# Patient Record
Sex: FEMALE | Race: BLACK OR AFRICAN AMERICAN
[De-identification: names, ages, dates, MRNs, and addresses within clinical notes are randomized per-mention and may not be internally consistent; named-entity substitution may affect disease eponyms.]

---

## 2018-04-15 ENCOUNTER — HOSPITAL ENCOUNTER (EMERGENCY)
Dept: HOSPITAL 35 - ER | Age: 33
Discharge: HOME | End: 2018-04-15
Payer: COMMERCIAL

## 2018-04-15 VITALS — BODY MASS INDEX: 24.43 KG/M2 | HEIGHT: 66 IN | WEIGHT: 152.01 LBS

## 2018-04-15 VITALS — DIASTOLIC BLOOD PRESSURE: 63 MMHG | SYSTOLIC BLOOD PRESSURE: 116 MMHG

## 2018-04-15 DIAGNOSIS — J02.9: Primary | ICD-10-CM

## 2018-04-15 DIAGNOSIS — Z87.891: ICD-10-CM

## 2019-11-18 ENCOUNTER — HOSPITAL ENCOUNTER (OUTPATIENT)
Dept: HOSPITAL 35 - ULTRA | Age: 34
End: 2019-11-18
Attending: FAMILY MEDICINE
Payer: COMMERCIAL

## 2019-11-18 DIAGNOSIS — N83.202: ICD-10-CM

## 2019-11-18 DIAGNOSIS — N83.201: Primary | ICD-10-CM

## 2019-11-18 DIAGNOSIS — N92.0: ICD-10-CM

## 2020-03-17 ENCOUNTER — HOSPITAL ENCOUNTER (EMERGENCY)
Dept: HOSPITAL 35 - ER | Age: 35
Discharge: HOME | End: 2020-03-17
Payer: COMMERCIAL

## 2020-03-17 VITALS — HEIGHT: 66 IN | WEIGHT: 173 LBS | BODY MASS INDEX: 27.8 KG/M2

## 2020-03-17 VITALS — SYSTOLIC BLOOD PRESSURE: 136 MMHG | DIASTOLIC BLOOD PRESSURE: 78 MMHG

## 2020-03-17 DIAGNOSIS — R19.7: ICD-10-CM

## 2020-03-17 DIAGNOSIS — R11.10: ICD-10-CM

## 2020-03-17 DIAGNOSIS — N20.0: Primary | ICD-10-CM

## 2020-03-17 DIAGNOSIS — F17.210: ICD-10-CM

## 2020-03-17 LAB
ALBUMIN SERPL-MCNC: 4.1 G/DL (ref 3.4–5)
ALT SERPL-CCNC: 17 U/L (ref 30–65)
ANION GAP SERPL CALC-SCNC: 12 MMOL/L (ref 7–16)
AST SERPL-CCNC: 16 U/L (ref 15–37)
BACTERIA-REFLEX: (no result) /HPF
BASOPHILS NFR BLD AUTO: 0.2 % (ref 0–2)
BILIRUB SERPL-MCNC: 0.5 MG/DL
BILIRUB UR-MCNC: NEGATIVE MG/DL
BUN SERPL-MCNC: 12 MG/DL (ref 7–18)
CALCIUM SERPL-MCNC: 8.8 MG/DL (ref 8.5–10.1)
CHLORIDE SERPL-SCNC: 101 MMOL/L (ref 98–107)
CO2 SERPL-SCNC: 24 MMOL/L (ref 21–32)
COLOR UR: YELLOW
CREAT SERPL-MCNC: 1 MG/DL (ref 0.6–1)
EOSINOPHIL NFR BLD: 0.3 % (ref 0–3)
ERYTHROCYTE [DISTWIDTH] IN BLOOD BY AUTOMATED COUNT: 13.6 % (ref 10.5–14.5)
GLUCOSE SERPL-MCNC: 101 MG/DL (ref 74–106)
GRANULOCYTES NFR BLD MANUAL: 86.6 % (ref 36–66)
HCT VFR BLD CALC: 39.3 % (ref 37–47)
HGB BLD-MCNC: 13 GM/DL (ref 12–15)
KETONES UR STRIP-MCNC: NEGATIVE MG/DL
LIPASE: 141 U/L (ref 73–393)
LYMPHOCYTES NFR BLD AUTO: 7.8 % (ref 24–44)
MCH RBC QN AUTO: 29.2 PG (ref 26–34)
MCHC RBC AUTO-ENTMCNC: 33.1 G/DL (ref 28–37)
MCV RBC: 88.2 FL (ref 80–100)
MONOCYTES NFR BLD: 5.1 % (ref 1–8)
NEUTROPHILS # BLD: 7.8 THOU/UL (ref 1.4–8.2)
PLATELET # BLD: 306 THOU/UL (ref 150–400)
POTASSIUM SERPL-SCNC: 3.3 MMOL/L (ref 3.5–5.1)
PROT SERPL-MCNC: 8.5 G/DL (ref 6.4–8.2)
RBC # BLD AUTO: 4.46 MIL/UL (ref 4.2–5)
RBC # UR STRIP: (no result) /UL
RBC #/AREA URNS HPF: (no result) /HPF (ref 0–2)
SODIUM SERPL-SCNC: 137 MMOL/L (ref 136–145)
SP GR UR STRIP: 1.02 (ref 1–1.03)
SQUAMOUS: (no result) /LPF (ref 0–3)
URINE CLARITY: (no result)
URINE GLUCOSE-RANDOM*: NEGATIVE
URINE LEUKOCYTES-REFLEX: (no result)
URINE NITRITE-REFLEX: NEGATIVE
URINE PROTEIN (DIPSTICK): (no result)
URINE WBC-REFLEX: (no result) /HPF (ref 0–5)
UROBILINOGEN UR STRIP-ACNC: 1 E.U./DL (ref 0.2–1)
WBC # BLD AUTO: 9 THOU/UL (ref 4–11)

## 2020-07-23 ENCOUNTER — HOSPITAL ENCOUNTER (OUTPATIENT)
Dept: HOSPITAL 35 - LAB | Age: 35
End: 2020-07-23
Attending: FAMILY MEDICINE
Payer: COMMERCIAL

## 2020-07-23 DIAGNOSIS — R06.02: Primary | ICD-10-CM

## 2020-07-23 DIAGNOSIS — Z20.828: ICD-10-CM

## 2020-12-22 ENCOUNTER — HOSPITAL ENCOUNTER (EMERGENCY)
Dept: HOSPITAL 35 - ER | Age: 35
Discharge: HOME | End: 2020-12-22
Payer: COMMERCIAL

## 2020-12-22 VITALS — HEIGHT: 66 IN | WEIGHT: 172 LBS | BODY MASS INDEX: 27.64 KG/M2

## 2020-12-22 VITALS — DIASTOLIC BLOOD PRESSURE: 67 MMHG | SYSTOLIC BLOOD PRESSURE: 118 MMHG

## 2020-12-22 DIAGNOSIS — R07.89: ICD-10-CM

## 2020-12-22 DIAGNOSIS — R09.81: Primary | ICD-10-CM

## 2020-12-22 DIAGNOSIS — R53.83: ICD-10-CM

## 2020-12-22 DIAGNOSIS — Z20.828: ICD-10-CM

## 2020-12-22 DIAGNOSIS — Z87.891: ICD-10-CM

## 2020-12-22 DIAGNOSIS — R05: ICD-10-CM

## 2020-12-22 LAB
ANION GAP SERPL CALC-SCNC: 7 MMOL/L (ref 7–16)
BASOPHILS NFR BLD AUTO: 0.8 % (ref 0–2)
BUN SERPL-MCNC: 8 MG/DL (ref 7–18)
CALCIUM SERPL-MCNC: 9.7 MG/DL (ref 8.5–10.1)
CHLORIDE SERPL-SCNC: 105 MMOL/L (ref 98–107)
CO2 SERPL-SCNC: 28 MMOL/L (ref 21–32)
CREAT SERPL-MCNC: 0.9 MG/DL (ref 0.6–1)
EOSINOPHIL NFR BLD: 2.2 % (ref 0–3)
ERYTHROCYTE [DISTWIDTH] IN BLOOD BY AUTOMATED COUNT: 13.4 % (ref 10.5–14.5)
GLUCOSE SERPL-MCNC: 97 MG/DL (ref 74–106)
GRANULOCYTES NFR BLD MANUAL: 61.9 % (ref 36–66)
HCT VFR BLD CALC: 35.3 % (ref 37–47)
HGB BLD-MCNC: 11.8 GM/DL (ref 12–15)
LYMPHOCYTES NFR BLD AUTO: 27.3 % (ref 24–44)
MCH RBC QN AUTO: 29.5 PG (ref 26–34)
MCHC RBC AUTO-ENTMCNC: 33.5 G/DL (ref 28–37)
MCV RBC: 88 FL (ref 80–100)
MONOCYTES NFR BLD: 7.8 % (ref 1–8)
NEUTROPHILS # BLD: 4.3 THOU/UL (ref 1.4–8.2)
PLATELET # BLD: 297 THOU/UL (ref 150–400)
POTASSIUM SERPL-SCNC: 3.8 MMOL/L (ref 3.5–5.1)
RBC # BLD AUTO: 4.01 MIL/UL (ref 4.2–5)
SODIUM SERPL-SCNC: 140 MMOL/L (ref 136–145)
WBC # BLD AUTO: 6.9 THOU/UL (ref 4–11)

## 2020-12-22 NOTE — EKG
[place] [address] [facility] [facility] [place]  [address]
Phone:  [phone]                    ELECTROCARDIOGRAM REPORT      
_______________________________________________________________________________
 
Name:       PRATIMA GALLEGO           [facility] #:                     [id number]     Account #:      [id number]  
Admission:  20    Attend Phys:                          
Discharge:  20    Date of Birth:  [birth year]  
                                                          Report #: [id number]
   [id number]
_______________________________________________________________________________
                         [facility] ED
                                       
Test Date:    2020               Test Time:    11:11:22
Pat Name:     PRATIMA GALLEGO            Department:   
Patient ID:   [id number]            Room:          
Gender:       F                        Technician:   KELLY
:          1985               Requested By: Wan Rodriguez
Order Number: [id number] MD:   Hector Wise
                                 Measurements
Intervals                              Axis          
Rate:         91                       P:            49
RI:           142                      QRS:          53
QRSD:         80                       T:            42
QT:           342                                    
QTc:          421                                    
                           Interpretive Statements
Sinus rhythm
Probable left atrial enlargement
Compared to ECG 2014 17:50:30
No significant changes
Electronically Signed On 2020 13:00:09 CST by Hector Wise
[email or web address]
 
 
 
 
 
 
 
 
 
 
 
 
 
 
 
 
 
 
 
 
 
  <ELECTRONICALLY SIGNED>
   By: Hector Wise MD, [facility]    
  20     1300
D: 20 1111                           _____________________________________
T: 20 1111                           Hector Wise MD, FACTED      /EPI

## 2020-12-25 ENCOUNTER — HOSPITAL ENCOUNTER (EMERGENCY)
Dept: HOSPITAL 35 - ER | Age: 35
Discharge: HOME | End: 2020-12-25
Payer: COMMERCIAL

## 2020-12-25 VITALS — BODY MASS INDEX: 27.64 KG/M2 | HEIGHT: 66 IN | WEIGHT: 172 LBS

## 2020-12-25 VITALS — SYSTOLIC BLOOD PRESSURE: 125 MMHG | DIASTOLIC BLOOD PRESSURE: 74 MMHG

## 2020-12-25 DIAGNOSIS — Z87.891: ICD-10-CM

## 2020-12-25 DIAGNOSIS — J02.0: Primary | ICD-10-CM

## 2020-12-25 DIAGNOSIS — Z79.899: ICD-10-CM

## 2021-01-30 ENCOUNTER — HOSPITAL ENCOUNTER (EMERGENCY)
Dept: HOSPITAL 35 - ER | Age: 36
Discharge: HOME | End: 2021-01-30
Payer: COMMERCIAL

## 2021-01-30 VITALS — DIASTOLIC BLOOD PRESSURE: 73 MMHG | SYSTOLIC BLOOD PRESSURE: 99 MMHG

## 2021-01-30 VITALS — BODY MASS INDEX: 27.47 KG/M2 | HEIGHT: 67 IN | WEIGHT: 175 LBS

## 2021-01-30 VITALS — HEIGHT: 66 IN | BODY MASS INDEX: 27.48 KG/M2 | WEIGHT: 171.01 LBS

## 2021-01-30 VITALS — DIASTOLIC BLOOD PRESSURE: 68 MMHG | SYSTOLIC BLOOD PRESSURE: 117 MMHG

## 2021-01-30 DIAGNOSIS — Z79.899: ICD-10-CM

## 2021-01-30 DIAGNOSIS — N20.0: Primary | ICD-10-CM

## 2021-01-30 DIAGNOSIS — Z87.891: ICD-10-CM

## 2021-01-30 DIAGNOSIS — N39.0: ICD-10-CM

## 2021-01-30 LAB
ALBUMIN SERPL-MCNC: 3.3 G/DL (ref 3.4–5)
ALBUMIN SERPL-MCNC: 3.5 G/DL (ref 3.4–5)
ALT SERPL-CCNC: 32 U/L (ref 30–65)
ALT SERPL-CCNC: 36 U/L (ref 30–65)
AMYLASE SERPL-CCNC: 89 U/L (ref 25–115)
ANION GAP SERPL CALC-SCNC: 10 MMOL/L (ref 7–16)
ANION GAP SERPL CALC-SCNC: 8 MMOL/L (ref 7–16)
AST SERPL-CCNC: 17 U/L (ref 15–37)
AST SERPL-CCNC: 19 U/L (ref 15–37)
BACTERIA-REFLEX: (no result) /HPF
BASOPHILS NFR BLD AUTO: 0.3 % (ref 0–2)
BASOPHILS NFR BLD AUTO: 0.6 % (ref 0–2)
BILIRUB DIRECT SERPL-MCNC: < 0.1 MG/DL
BILIRUB SERPL-MCNC: 0.2 MG/DL (ref 0.2–1)
BILIRUB SERPL-MCNC: 0.3 MG/DL (ref 0.2–1)
BILIRUB UR-MCNC: NEGATIVE MG/DL
BILIRUB UR-MCNC: NEGATIVE MG/DL
BUN SERPL-MCNC: 10 MG/DL (ref 7–18)
BUN SERPL-MCNC: 9 MG/DL (ref 7–18)
CALCIUM OXALATE: (no result) /LPF
CALCIUM OXALATE: (no result) /LPF
CALCIUM SERPL-MCNC: 8.6 MG/DL (ref 8.5–10.1)
CALCIUM SERPL-MCNC: 9.2 MG/DL (ref 8.5–10.1)
CHLORIDE SERPL-SCNC: 105 MMOL/L (ref 98–107)
CHLORIDE SERPL-SCNC: 106 MMOL/L (ref 98–107)
CO2 SERPL-SCNC: 25 MMOL/L (ref 21–32)
CO2 SERPL-SCNC: 27 MMOL/L (ref 21–32)
COLOR UR: (no result)
COLOR UR: YELLOW
CREAT SERPL-MCNC: 1 MG/DL (ref 0.6–1)
CREAT SERPL-MCNC: 1.4 MG/DL (ref 0.6–1)
EOSINOPHIL NFR BLD: 1.2 % (ref 0–3)
EOSINOPHIL NFR BLD: 2.3 % (ref 0–3)
ERYTHROCYTE [DISTWIDTH] IN BLOOD BY AUTOMATED COUNT: 13.4 % (ref 10.5–14.5)
ERYTHROCYTE [DISTWIDTH] IN BLOOD BY AUTOMATED COUNT: 13.5 % (ref 10.5–14.5)
GLUCOSE SERPL-MCNC: 111 MG/DL (ref 74–106)
GLUCOSE SERPL-MCNC: 120 MG/DL (ref 74–106)
GRANULOCYTES NFR BLD MANUAL: 53 % (ref 36–66)
GRANULOCYTES NFR BLD MANUAL: 69 % (ref 36–66)
HCT VFR BLD CALC: 33.8 % (ref 37–47)
HCT VFR BLD CALC: 34.4 % (ref 37–47)
HGB BLD-MCNC: 11.1 GM/DL (ref 12–15)
HGB BLD-MCNC: 11.6 GM/DL (ref 12–15)
KETONES UR STRIP-MCNC: NEGATIVE MG/DL
KETONES UR STRIP-MCNC: NEGATIVE MG/DL
LIPASE: 294 U/L (ref 73–393)
LYMPHOCYTES NFR BLD AUTO: 23.4 % (ref 24–44)
LYMPHOCYTES NFR BLD AUTO: 36.4 % (ref 24–44)
MCH RBC QN AUTO: 29 PG (ref 26–34)
MCH RBC QN AUTO: 29.7 PG (ref 26–34)
MCHC RBC AUTO-ENTMCNC: 32.8 G/DL (ref 28–37)
MCHC RBC AUTO-ENTMCNC: 33.8 G/DL (ref 28–37)
MCV RBC: 87.8 FL (ref 80–100)
MCV RBC: 88.3 FL (ref 80–100)
MONOCYTES NFR BLD: 6.1 % (ref 1–8)
MONOCYTES NFR BLD: 7.7 % (ref 1–8)
MUCUS: (no result) STRN/LPF
NEUTROPHILS # BLD: 4.1 THOU/UL (ref 1.4–8.2)
NEUTROPHILS # BLD: 8.6 THOU/UL (ref 1.4–8.2)
PLATELET # BLD: 325 THOU/UL (ref 150–400)
PLATELET # BLD: 335 THOU/UL (ref 150–400)
POTASSIUM SERPL-SCNC: 3.5 MMOL/L (ref 3.5–5.1)
POTASSIUM SERPL-SCNC: 3.8 MMOL/L (ref 3.5–5.1)
PROT SERPL-MCNC: 7.1 G/DL (ref 6.4–8.2)
PROT SERPL-MCNC: 7.5 G/DL (ref 6.4–8.2)
RBC # BLD AUTO: 3.83 MIL/UL (ref 4.2–5)
RBC # BLD AUTO: 3.92 MIL/UL (ref 4.2–5)
RBC # UR STRIP: (no result) /UL
RBC # UR STRIP: (no result) /UL
RBC #/AREA URNS HPF: (no result) /HPF (ref 0–2)
RBC #/AREA URNS HPF: (no result) /HPF (ref 0–2)
SODIUM SERPL-SCNC: 140 MMOL/L (ref 136–145)
SODIUM SERPL-SCNC: 141 MMOL/L (ref 136–145)
SP GR UR STRIP: 1.02 (ref 1–1.03)
SP GR UR STRIP: >= 1.03 (ref 1–1.03)
SQUAMOUS: (no result) /LPF (ref 0–3)
SQUAMOUS: (no result) /LPF (ref 0–3)
TRANSITIONAL EPITHEL CELL: (no result) /LPF
URINE CLARITY: (no result)
URINE CLARITY: (no result)
URINE GLUCOSE-RANDOM*: NEGATIVE
URINE GLUCOSE-RANDOM*: NEGATIVE
URINE LEUKOCYTES-REFLEX: NEGATIVE
URINE LEUKOCYTES-REFLEX: NEGATIVE
URINE NITRITE-REFLEX: NEGATIVE
URINE NITRITE-REFLEX: NEGATIVE
URINE PROTEIN (DIPSTICK): (no result)
URINE PROTEIN (DIPSTICK): (no result)
URINE WBC-REFLEX: (no result) /HPF (ref 0–5)
URINE WBC-REFLEX: (no result) /HPF (ref 0–5)
UROBILINOGEN UR STRIP-ACNC: 0.2 E.U./DL (ref 0.2–1)
UROBILINOGEN UR STRIP-ACNC: 0.2 E.U./DL (ref 0.2–1)
WBC # BLD AUTO: 12.5 THOU/UL (ref 4–11)
WBC # BLD AUTO: 7.7 THOU/UL (ref 4–11)

## 2021-02-10 ENCOUNTER — HOSPITAL ENCOUNTER (EMERGENCY)
Dept: HOSPITAL 35 - ER | Age: 36
Discharge: HOME | End: 2021-02-10
Payer: COMMERCIAL

## 2021-02-10 VITALS — SYSTOLIC BLOOD PRESSURE: 118 MMHG | DIASTOLIC BLOOD PRESSURE: 74 MMHG

## 2021-02-10 VITALS — WEIGHT: 171.01 LBS | HEIGHT: 66 IN | BODY MASS INDEX: 27.48 KG/M2

## 2021-02-10 DIAGNOSIS — N20.0: Primary | ICD-10-CM

## 2021-02-10 DIAGNOSIS — Z79.899: ICD-10-CM

## 2021-02-10 DIAGNOSIS — Z87.891: ICD-10-CM

## 2021-02-10 LAB
ALBUMIN SERPL-MCNC: 3.6 G/DL (ref 3.4–5)
ALT SERPL-CCNC: 35 U/L (ref 14–59)
ANION GAP SERPL CALC-SCNC: 12 MMOL/L (ref 7–16)
AST SERPL-CCNC: 15 U/L (ref 15–37)
BACTERIA-REFLEX: (no result) /HPF
BASOPHILS NFR BLD AUTO: 0.6 % (ref 0–2)
BILIRUB SERPL-MCNC: 0.3 MG/DL (ref 0.2–1)
BILIRUB UR-MCNC: NEGATIVE MG/DL
BUN SERPL-MCNC: 10 MG/DL (ref 7–18)
CALCIUM OXALATE: (no result) /LPF
CALCIUM SERPL-MCNC: 8.9 MG/DL (ref 8.5–10.1)
CHLORIDE SERPL-SCNC: 103 MMOL/L (ref 98–107)
CO2 SERPL-SCNC: 24 MMOL/L (ref 21–32)
COLOR UR: (no result)
CREAT SERPL-MCNC: 1.2 MG/DL (ref 0.6–1)
EOSINOPHIL NFR BLD: 1.4 % (ref 0–3)
ERYTHROCYTE [DISTWIDTH] IN BLOOD BY AUTOMATED COUNT: 13.5 % (ref 10.5–14.5)
GLUCOSE SERPL-MCNC: 104 MG/DL (ref 74–106)
GRANULOCYTES NFR BLD MANUAL: 61.9 % (ref 36–66)
HCT VFR BLD CALC: 35.2 % (ref 37–47)
HGB BLD-MCNC: 11.8 GM/DL (ref 12–15)
KETONES UR STRIP-MCNC: (no result) MG/DL
LYMPHOCYTES NFR BLD AUTO: 27.9 % (ref 24–44)
MCH RBC QN AUTO: 29.3 PG (ref 26–34)
MCHC RBC AUTO-ENTMCNC: 33.4 G/DL (ref 28–37)
MCV RBC: 87.7 FL (ref 80–100)
MONOCYTES NFR BLD: 8.2 % (ref 1–8)
MUCUS: (no result) STRN/LPF
NEUTROPHILS # BLD: 5.8 THOU/UL (ref 1.4–8.2)
PLATELET # BLD: 307 THOU/UL (ref 150–400)
POTASSIUM SERPL-SCNC: 3.7 MMOL/L (ref 3.5–5.1)
PROT SERPL-MCNC: 7.7 G/DL (ref 6.4–8.2)
RBC # BLD AUTO: 4.01 MIL/UL (ref 4.2–5)
RBC # UR STRIP: (no result) /UL
RBC #/AREA URNS HPF: (no result) /HPF (ref 0–2)
SODIUM SERPL-SCNC: 139 MMOL/L (ref 136–145)
SP GR UR STRIP: 1.02 (ref 1–1.03)
SQUAMOUS: (no result) /LPF (ref 0–3)
URINE CLARITY: (no result)
URINE GLUCOSE-RANDOM*: NEGATIVE
URINE LEUKOCYTES-REFLEX: NEGATIVE
URINE NITRITE-REFLEX: NEGATIVE
URINE PROTEIN (DIPSTICK): (no result)
URINE WBC-REFLEX: (no result) /HPF (ref 0–5)
UROBILINOGEN UR STRIP-ACNC: 0.2 E.U./DL (ref 0.2–1)
WBC # BLD AUTO: 9.4 THOU/UL (ref 4–11)

## 2021-07-07 ENCOUNTER — HOSPITAL ENCOUNTER (EMERGENCY)
Dept: HOSPITAL 35 - ER | Age: 36
Discharge: HOME | End: 2021-07-07
Payer: COMMERCIAL

## 2021-07-07 VITALS — BODY MASS INDEX: 28.28 KG/M2 | HEIGHT: 66 IN | WEIGHT: 176 LBS

## 2021-07-07 VITALS — DIASTOLIC BLOOD PRESSURE: 69 MMHG | SYSTOLIC BLOOD PRESSURE: 111 MMHG

## 2021-07-07 DIAGNOSIS — R07.89: Primary | ICD-10-CM

## 2021-07-07 DIAGNOSIS — Z98.51: ICD-10-CM

## 2021-07-07 DIAGNOSIS — Z98.890: ICD-10-CM

## 2021-07-07 DIAGNOSIS — F17.210: ICD-10-CM

## 2021-07-07 DIAGNOSIS — Z87.442: ICD-10-CM

## 2021-07-07 LAB
ALBUMIN SERPL-MCNC: 3.7 G/DL (ref 3.4–5)
ALT SERPL-CCNC: 21 U/L (ref 30–65)
ANION GAP SERPL CALC-SCNC: 9 MMOL/L (ref 7–16)
AST SERPL-CCNC: 17 U/L (ref 15–37)
BASOPHILS NFR BLD AUTO: 0.5 % (ref 0–2)
BILIRUB SERPL-MCNC: 0.4 MG/DL (ref 0.2–1)
BUN SERPL-MCNC: 6 MG/DL (ref 7–18)
CALCIUM SERPL-MCNC: 8.7 MG/DL (ref 8.5–10.1)
CHLORIDE SERPL-SCNC: 105 MMOL/L (ref 98–107)
CO2 SERPL-SCNC: 26 MMOL/L (ref 21–32)
CREAT SERPL-MCNC: 0.9 MG/DL (ref 0.6–1)
EOSINOPHIL NFR BLD: 1.4 % (ref 0–3)
ERYTHROCYTE [DISTWIDTH] IN BLOOD BY AUTOMATED COUNT: 13.8 % (ref 10.5–14.5)
GLUCOSE SERPL-MCNC: 83 MG/DL (ref 74–106)
GRANULOCYTES NFR BLD MANUAL: 53.9 % (ref 36–66)
HCT VFR BLD CALC: 34.2 % (ref 37–47)
HGB BLD-MCNC: 11.4 GM/DL (ref 12–15)
LYMPHOCYTES NFR BLD AUTO: 38.4 % (ref 24–44)
MCH RBC QN AUTO: 29.7 PG (ref 26–34)
MCHC RBC AUTO-ENTMCNC: 33.5 G/DL (ref 28–37)
MCV RBC: 88.5 FL (ref 80–100)
MONOCYTES NFR BLD: 5.8 % (ref 1–8)
NEUTROPHILS # BLD: 6 THOU/UL (ref 1.4–8.2)
PLATELET # BLD: 320 THOU/UL (ref 150–400)
POTASSIUM SERPL-SCNC: 3.3 MMOL/L (ref 3.5–5.1)
PROT SERPL-MCNC: 7.7 G/DL (ref 6.4–8.2)
RBC # BLD AUTO: 3.86 MIL/UL (ref 4.2–5)
SODIUM SERPL-SCNC: 140 MMOL/L (ref 136–145)
TROPONIN I SERPL-MCNC: <0.06 NG/ML (ref ?–0.06)
WBC # BLD AUTO: 11.2 THOU/UL (ref 4–11)

## 2021-07-08 NOTE — EKG
[place] [address] [facility] [place]  [address]
Phone:  [phone]                    ELECTROCARDIOGRAM REPORT      
_______________________________________________________________________________
 
Name:       PRATIMA GALLEGO           [facility]                     [id number]     Account #:      [id number]  
Admission:  21    Attend Phys:                          
Discharge:  21    Date of Birth:  [birth year]  
                                                          Report #: [id number]
   [id number]
_______________________________________________________________________________
                         [facility] ED
                                       
Test Date:    2021               Test Time:    17:21:52
Pat Name:     PRATIMA GALLEGO            Department:   
Patient ID:   [id number]            Room:          
Gender:       F                        Technician:   VANNA BRYANT
:          1985               Requested By: Alvarado Bustamante
Order Number: [id number] MD:   Hector Wise
                                 Measurements
Intervals                              Axis          
Rate:         69                       P:            38
NC:           144                      QRS:          36
QRSD:         88                       T:            31
QT:           390                                    
QTc:          418                                    
                           Interpretive Statements
Sinus rhythm
Compared to ECG 2020 11:11:22
No significant changes
Electronically Signed On 2021 7:25:34 CDT by Hector Wise
[email or web address]
 
 
 
 
 
 
 
 
 
 
 
 
 
 
 
 
 
 
 
 
 
 
  <ELECTRONICALLY SIGNED>
   By: Hector Wise MD, [facility]    
  21     0725
D: 21 172                           _____________________________________
T: 21 1721                           Hector Wise MD, FACC      /EPI

## 2022-02-22 ENCOUNTER — HOSPITAL ENCOUNTER (INPATIENT)
Dept: HOSPITAL 35 - ER | Age: 37
LOS: 4 days | Discharge: HOME | DRG: 494 | End: 2022-02-26
Attending: SURGERY | Admitting: SURGERY
Payer: COMMERCIAL

## 2022-02-22 VITALS — SYSTOLIC BLOOD PRESSURE: 128 MMHG | DIASTOLIC BLOOD PRESSURE: 68 MMHG

## 2022-02-22 VITALS — SYSTOLIC BLOOD PRESSURE: 135 MMHG | DIASTOLIC BLOOD PRESSURE: 63 MMHG

## 2022-02-22 VITALS — HEIGHT: 65.98 IN | WEIGHT: 173 LBS | BODY MASS INDEX: 27.8 KG/M2

## 2022-02-22 VITALS — DIASTOLIC BLOOD PRESSURE: 66 MMHG | SYSTOLIC BLOOD PRESSURE: 107 MMHG
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
## 2022-02-22 DIAGNOSIS — Z20.822: ICD-10-CM

## 2022-02-22 DIAGNOSIS — Z23: ICD-10-CM
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
## 2022-02-22 DIAGNOSIS — Z87.442: ICD-10-CM

## 2022-02-22 DIAGNOSIS — Z79.899: ICD-10-CM

## 2022-02-22 DIAGNOSIS — S82.432A: ICD-10-CM
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
## 2022-02-22 DIAGNOSIS — S82.232A: Primary | ICD-10-CM
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
## 2022-02-22 PROCEDURE — 62900: CPT

## 2022-02-22 PROCEDURE — 56525: CPT

## 2022-02-22 PROCEDURE — 57861: CPT

## 2022-02-22 PROCEDURE — 50101: CPT

## 2022-02-22 PROCEDURE — 56524: CPT

## 2022-02-22 PROCEDURE — 53078: CPT

## 2022-02-22 PROCEDURE — 50386 REMOVE STENT VIA TRANSURETH: CPT

## 2022-02-22 PROCEDURE — 51412: CPT

## 2022-02-22 PROCEDURE — 10102: CPT

## 2022-02-22 PROCEDURE — 62110: CPT

## 2022-02-22 PROCEDURE — 57180 TREAT VAGINAL BLEEDING: CPT

## 2022-02-22 PROCEDURE — 59211: CPT

## 2022-02-22 PROCEDURE — 50010 RENAL EXPLORATION: CPT

## 2022-02-22 PROCEDURE — 56526: CPT

## 2022-02-22 PROCEDURE — 57091: CPT

## 2022-02-22 PROCEDURE — 50635: CPT

## 2022-02-22 PROCEDURE — 70005: CPT

## 2022-02-22 PROCEDURE — 57103: CPT

## 2022-02-22 PROCEDURE — 57860: CPT

## 2022-02-23 VITALS — SYSTOLIC BLOOD PRESSURE: 116 MMHG | DIASTOLIC BLOOD PRESSURE: 61 MMHG

## 2022-02-23 VITALS — DIASTOLIC BLOOD PRESSURE: 55 MMHG | SYSTOLIC BLOOD PRESSURE: 112 MMHG

## 2022-02-23 VITALS — DIASTOLIC BLOOD PRESSURE: 70 MMHG | SYSTOLIC BLOOD PRESSURE: 137 MMHG

## 2022-02-23 LAB
ALBUMIN SERPL-MCNC: 3.3 G/DL (ref 3.4–5)
ANION GAP SERPL CALC-SCNC: 11 MMOL/L (ref 7–16)
BUN SERPL-MCNC: 7 MG/DL (ref 7–18)
CALCIUM SERPL-MCNC: 8.7 MG/DL (ref 8.5–10.1)
CHLORIDE SERPL-SCNC: 101 MMOL/L (ref 98–107)
CO2 SERPL-SCNC: 26 MMOL/L (ref 21–32)
CREAT SERPL-MCNC: 1 MG/DL (ref 0.6–1)
ERYTHROCYTE [DISTWIDTH] IN BLOOD BY AUTOMATED COUNT: 13.9 % (ref 10.5–14.5)
GLUCOSE SERPL-MCNC: 110 MG/DL (ref 74–106)
HCT VFR BLD CALC: 31.8 % (ref 37–47)
HGB BLD-MCNC: 10.5 GM/DL (ref 12–15)
MAGNESIUM SERPL-MCNC: 1.9 MG/DL (ref 1.8–2.4)
MCH RBC QN AUTO: 29.2 PG (ref 26–34)
MCHC RBC AUTO-ENTMCNC: 32.8 G/DL (ref 28–37)
MCV RBC: 88.8 FL (ref 80–100)
PHOSPHATE SERPL-MCNC: 3 MG/DL (ref 2.5–4.9)
PLATELET # BLD: 302 THOU/UL (ref 150–400)
POTASSIUM SERPL-SCNC: 3.3 MMOL/L (ref 3.5–5.1)
RBC # BLD AUTO: 3.58 MIL/UL (ref 4.2–5)
SODIUM SERPL-SCNC: 138 MMOL/L (ref 136–145)
WBC # BLD AUTO: 16.2 THOU/UL (ref 4–11)

## 2022-02-23 NOTE — O
[facility]
[address]
[place]   [address]                     OPERATIVE REPORT              
_______________________________________________________________________________
 
Name:       PRATIMA GALLEGO           Room #:         441-P       ADM IN  
M.R.#:      [id number]                       Account #:      [id number]  
Admission:  02/22/22    Attend Phys:    Ciro Britt MD    
Discharge:              Date of Birth:  [birth year]  
                                                          Report #: [id number]
                                                                    [id number] 
_______________________________________________________________________________
THIS REPORT FOR:  
 
cc:  Crystal Dawn MD, Nora P. MD Kneidel, Matthew T. MD                                        ~
 
DATE OF SERVICE: 02/22/2022
 
PREOPERATIVE DIAGNOSIS:  Left open tibial shaft fracture.
 
POSTOPERATIVE DIAGNOSIS:  Left open tibial shaft fracture.
 
PROCEDURES:
1.  Left tibia open fracture irrigation and debridement.
2.  Left tibia intramedullary nail.
 
SURGEON:  Edi Riojas MD
 
ANESTHESIA:  General.
 
ESTIMATED BLOOD LOSS:  50 mL.
 
DRAINS:  No drains.
 
TOURNIQUET TIME:  45 minutes.
 
DESCRIPTION OF PROCEDURE:  The patient was brought to the operating room where 
she was placed under general anesthesia.  Once under adequate general 
anesthesia, the patient's left lower extremity was then prepped and draped in a 
sterile manner.  The extremity was elevated and tourniquet placed to 300 mmHg.  
The patient's open wound at the [id number] shaft of the tibia medially was 
then opened, excising the open lesion with a 10 blade.  This was right at the 
tibial fracture site.  This wound was then irrigated copiously, along with the 
fracture with pulsatile lavage with normal saline, 6 liters were run through the
wound.  We then made a proximal incision overlying the patellar tendon, 
dissected down to the paratenon, which was incised in line with the incision and
exposure medially was made.  A curved cannulated awl was then placed into the 
proximal tibia and the guidewire for the intramedullary nail was then placed.  
Once in place and past the fracture site in a central position in the distal 
tibia, the tibia was then reamed to 11 mm for a 10 mm nail.  The 10 mm nail was 
then placed from proximal to distal down the shaft of the tibia and across the 
fracture site.  Excellent alignment was achieved in this fashion.  There was 
some distraction across the fracture site.  Therefore, distally 2 transverse 
locking screws were placed and the slap hammer was then utilized proximally to 
bring the fracture fragments closer together.  Once this was done, excellent 
alignment of the fracture was achieved.  A 10 mm nail had been placed.  The 
 
 
 
[address]   [address]                     OPERATIVE REPORT              
_______________________________________________________________________________
 
Name:       PRATIMA GALLEGO           Room #:         441-P       [facility] [place]  
.R.#:      [id number]                       Account #:      [id number]  
Admission:  02/22/22    Attend Phys:    Ciro Britt MD    
Discharge:              Date of Birth:  [birth year]  
                                                          Report #: [id number]
                                                                    [id number] 
_______________________________________________________________________________
 
proximal locking screws were then placed utilizing the jig for the 
intramedullary nail proximally.  Excellent fixation and alignment was achieved. 
Once complete, the wounds were then irrigated copiously and closed with 2-0 
Vicryl in the paratenon, 2-0 Vicryl in the subcutaneous tissues and staples were
used for the skin, except for the open fracture region, which was then irrigated
copiously and closed with 2-0 nylon suture for the skin.  The wounds were 
dressed with Xeroform, 4x4s, ABDs, and a sterile soft compressive dressing was 
placed.  The tourniquet had been let down at reaming at approximately 20 minutes
and placed back up during wound closure for another 20 minutes or so, for a 
total of 45 minutes.  The tourniquet was let down.  Toes were pink and warm.  
Good capillary refill.  There were no complications from the procedure.  The 
patient tolerated the procedure well and was to the recovery room without 
incident.
 
 
 
 
 
 
 
 
 
 
 
 
 
 
 
 
 
 
 
 
 
 
 
 
 
 
 
 
 
 
  <ELECTRONICALLY SIGNED>
   By: Edi Riojas MD        
  02/23/22     1640
D: 02/22/22 1349                           _____________________________________
T: 02/22/22 1512                           Edi Riojas MD          /nt

## 2022-02-23 NOTE — NUR
Pt. rested quietly at intervals during the night when checked on during
frequent rounds.  Pain meds given for c/o pain to left lower leg (see emar)
with some relief noted.  Dressing to left lower leg is dry and intact.  Ice
packs applied to site and left leg elevated up on a pillow.  Bed alarm is on.

## 2022-02-23 NOTE — NUR
BEDSIDE REPORT GIVEN TO NIGHT SHIFT NURSE, PT GIVEN PAIN MEDICATION
APPROXIMATELY ONE HOUR BEFORE SHIFT CHANGE, PT COMFORTABLE AND IN STABLE
CONDITION.

## 2022-02-24 VITALS — DIASTOLIC BLOOD PRESSURE: 51 MMHG | SYSTOLIC BLOOD PRESSURE: 116 MMHG

## 2022-02-24 VITALS — DIASTOLIC BLOOD PRESSURE: 58 MMHG | SYSTOLIC BLOOD PRESSURE: 101 MMHG

## 2022-02-24 VITALS — SYSTOLIC BLOOD PRESSURE: 111 MMHG | DIASTOLIC BLOOD PRESSURE: 66 MMHG

## 2022-02-24 NOTE — NUR
A/O X 4, ROOM AIR, ONE ASSIST WITH CRUCTHES, HAS CAM BOOT AT BEDSIDE, LEFT AC
IV SALIN LOCKED, CONT B/B, LEFT LEG AQUACEL D/C/I, OXYCODONE GIVEN PRN, WORKED
WITH PT

## 2022-02-24 NOTE — NUR
Met with patient her mother at her bedside. patient admits with tibia
fracture. She fell on ice. Patient plans to stay with her mother at [place]. She
has 2 children, 17 and 4. Her employment aware of accident and she reports
adequate time off work. Patient issued walker today for tenative plan for dc
today. Therapy worked with patient on steps. She plans to use her mothers
crutches. patient reports she would be ok to stay another day. She had
terrible pain today and wants to be sure managed. Left message with Rn.
casemgt follow if further needs arise

## 2022-02-24 NOTE — NUR
PT ALERT AND ORIENTED X4. PT IS A SB ASSIST TO BATHROOM, EXTRA TIME NEEDED DUE
TO PAIN. PAIN WELL CONTROLLED OVERNIGHT WITH PRN TREATMENT. PT IS EXPERIENCING
OCCASIONAL MUSCLE SPASMS IN THE RIGHT LEG, THEY HAVE RESPNDED WELL TO ICE AND
HEAT APPLICATION. PT IS VERY PLEASANT BUT FEARFUL OF RECOVERY PROCESS AS THIS
IS HER FIRST BROKEN BONE. EDUCATION ON CARE PLAN, PAIN MANAGEMENT, AND THERAPY
PROVIDED.

## 2022-02-25 VITALS — SYSTOLIC BLOOD PRESSURE: 101 MMHG | DIASTOLIC BLOOD PRESSURE: 60 MMHG

## 2022-02-25 VITALS — SYSTOLIC BLOOD PRESSURE: 121 MMHG | DIASTOLIC BLOOD PRESSURE: 74 MMHG

## 2022-02-25 VITALS — SYSTOLIC BLOOD PRESSURE: 127 MMHG | DIASTOLIC BLOOD PRESSURE: 70 MMHG

## 2022-02-25 NOTE — NUR
ASSUME CARE OF PATIENT THIS MORNING. PATIENT IN BED SUPINE WITH EYES CLOSED.
NO COMPLAINTS OF PAIN AT THIS TIME, MEDICATION WAS GIVEN TO HER AT 0600. IV IN
PLACE AND SALINE LOCKED, SURGERY DRESSING IS IN PLACE AND INTAKE WITH NO
VISUAL DRAINAGE. WILL CONTINUE TO MONITOR AND UPDATE NOTES AS NEEDED.

## 2022-02-25 NOTE — NUR
PAIN CONTROLLED WITH  PRN MANAGEMENT. PT REMAINS A SB ASSIST WTHIN
THE ROOM. PT REPORTS EXCITEMENT TO GO HOME BUT IS CONCERNED WITH PAIN CONTROL,
THERAPY, AND HOW LONG SHE WILL BE OUT OF WORK. PT HAS NOT HAD A BOWEL MOVEMENT
SINCE SURGERY. VVS. NO ACUTE EVENTS

## 2022-02-26 VITALS — SYSTOLIC BLOOD PRESSURE: 111 MMHG | DIASTOLIC BLOOD PRESSURE: 71 MMHG

## 2022-02-26 VITALS — DIASTOLIC BLOOD PRESSURE: 71 MMHG | SYSTOLIC BLOOD PRESSURE: 111 MMHG

## 2022-02-26 NOTE — NUR
A/O X 4, ROOM AIR, ONE ASSIST, LEFT AC PIV SALINE LOCKED, CONT B/B, AQUACEL
L LEFT D/C/I, ICE PACKS ON LEFT LEG, LEFT LEG ACE WRAPPED ABOVE KNEE TO
TOES-D/C/I, LEFT LEG PAIN OXYCODONE GIVEN.

## 2022-02-26 NOTE — NUR
RECEIVED CARE OF THIS PATIENT AT 1900.  PATIENT ALERT AND ORIENTED X4.  UP AD
TYESHA WITH WALKER AND SBA.  DRESSING ON L LOWER EXT D/I.  ABLE TO MOVE TOES AND
HAS GREAT SENSATION IN THEM.  C/O PAIN, MED GIVEN.  SLEPT MOST OF THE NIGHT.